# Patient Record
Sex: MALE | Race: WHITE | NOT HISPANIC OR LATINO | Employment: FULL TIME | URBAN - METROPOLITAN AREA
[De-identification: names, ages, dates, MRNs, and addresses within clinical notes are randomized per-mention and may not be internally consistent; named-entity substitution may affect disease eponyms.]

---

## 2023-11-09 PROBLEM — R06.09 DYSPNEA ON EXERTION: Status: ACTIVE | Noted: 2023-11-09

## 2023-11-09 PROBLEM — R73.09 ABNORMAL BLOOD SUGAR: Status: ACTIVE | Noted: 2023-11-09

## 2023-11-09 PROBLEM — R79.89 ABNORMAL LFTS (LIVER FUNCTION TESTS): Status: ACTIVE | Noted: 2023-11-09

## 2023-11-09 PROBLEM — E88.819 INSULIN RESISTANCE: Status: ACTIVE | Noted: 2023-11-09

## 2023-11-09 PROBLEM — E66.01 MORBID OBESITY (MULTI): Status: ACTIVE | Noted: 2023-11-09

## 2023-11-09 PROBLEM — N39.43 URINARY INCONTINENCE, POST-VOID DRIBBLING: Status: ACTIVE | Noted: 2023-11-09

## 2023-11-09 PROBLEM — N13.8 BPH WITH URINARY OBSTRUCTION: Status: ACTIVE | Noted: 2023-11-09

## 2023-11-09 PROBLEM — N40.1 BPH WITH URINARY OBSTRUCTION: Status: ACTIVE | Noted: 2023-11-09

## 2023-11-09 PROBLEM — R13.10 DYSPHAGIA: Status: ACTIVE | Noted: 2023-11-09

## 2023-11-09 PROBLEM — E78.5 HYPERLIPIDEMIA: Status: ACTIVE | Noted: 2023-11-09

## 2023-11-09 PROBLEM — I10 HYPERTENSION: Status: ACTIVE | Noted: 2023-11-09

## 2023-11-09 PROBLEM — R63.5 WEIGHT GAIN, ABNORMAL: Status: ACTIVE | Noted: 2023-11-09

## 2023-11-09 PROBLEM — M72.2 PLANTAR FASCIITIS, LEFT: Status: ACTIVE | Noted: 2023-11-09

## 2023-11-09 PROBLEM — N52.9 ERECTILE DYSFUNCTION: Status: ACTIVE | Noted: 2023-11-09

## 2023-11-09 PROBLEM — R35.0 FREQUENCY OF URINATION: Status: ACTIVE | Noted: 2023-11-09

## 2023-11-09 PROBLEM — E34.9 TESTOSTERONE DEFICIENCY: Status: ACTIVE | Noted: 2023-11-09

## 2023-11-09 PROBLEM — E55.9 VITAMIN D DEFICIENCY: Status: ACTIVE | Noted: 2023-11-09

## 2023-11-09 PROBLEM — D22.9 NEVUS, ATYPICAL: Status: ACTIVE | Noted: 2023-11-09

## 2023-11-09 RX ORDER — TESTOSTERONE ENANTHATE 200 MG/ML
VIAL (ML) INTRAMUSCULAR
COMMUNITY
Start: 2016-08-26

## 2023-11-09 RX ORDER — LISINOPRIL 20 MG/1
1 TABLET ORAL DAILY
COMMUNITY
Start: 2014-09-12

## 2023-11-11 ENCOUNTER — ANCILLARY PROCEDURE (OUTPATIENT)
Dept: RADIOLOGY | Facility: CLINIC | Age: 53
End: 2023-11-11
Payer: COMMERCIAL

## 2023-11-11 DIAGNOSIS — Z13.6 ENCOUNTER FOR SCREENING FOR CARDIOVASCULAR DISORDERS: ICD-10-CM

## 2023-11-11 PROCEDURE — 75571 CT HRT W/O DYE W/CA TEST: CPT

## 2023-11-13 ENCOUNTER — ANCILLARY PROCEDURE (OUTPATIENT)
Dept: RADIOLOGY | Facility: CLINIC | Age: 53
End: 2023-11-13
Payer: COMMERCIAL

## 2023-11-13 ENCOUNTER — OFFICE VISIT (OUTPATIENT)
Dept: ORTHOPEDIC SURGERY | Facility: CLINIC | Age: 53
End: 2023-11-13
Payer: COMMERCIAL

## 2023-11-13 DIAGNOSIS — S82.839A AVULSION FRACTURE OF DISTAL END OF FIBULA: Primary | ICD-10-CM

## 2023-11-13 DIAGNOSIS — M25.572 ACUTE LEFT ANKLE PAIN: ICD-10-CM

## 2023-11-13 PROCEDURE — 27786 TREATMENT OF ANKLE FRACTURE: CPT | Performed by: ORTHOPAEDIC SURGERY

## 2023-11-13 PROCEDURE — 99203 OFFICE O/P NEW LOW 30 MIN: CPT | Performed by: ORTHOPAEDIC SURGERY

## 2023-11-13 PROCEDURE — 73610 X-RAY EXAM OF ANKLE: CPT | Mod: LEFT SIDE | Performed by: ORTHOPAEDIC SURGERY

## 2023-11-13 PROCEDURE — 73610 X-RAY EXAM OF ANKLE: CPT | Mod: LT

## 2023-11-13 PROCEDURE — 99213 OFFICE O/P EST LOW 20 MIN: CPT | Mod: 25 | Performed by: ORTHOPAEDIC SURGERY

## 2023-11-13 NOTE — LETTER
November 13, 2023     Patient: Usama Alves   YOB: 1970   Date of Visit: 11/13/2023       To Whom It May Concern:    It is my medical opinion that Usama Alves may return to work on 11/13/23 with the following restrictions: must wear boot, mostly sit down type work, ok to drive tow motor, use knee walker/crutches as needed for ambulation .    If you have any questions or concerns, please don't hesitate to call.         Sincerely,        Mart Arora MD    CC: No Recipients

## 2023-11-13 NOTE — PROGRESS NOTES
History: Usama is here today for his left ankle.  He stepped on a large rock while at work 10 days ago and rolled his ankle.  He noted acute onset of pain and went to an urgent care.  They gave him a boot and crutches and have him follow-up with orthopedics.  He is here today as a new patient.    Past medical history: Multiple  Medications: Multiple  Allergies: No known drug allergies    Please refer to the intake H&P regarding the patient's review of systems, family history and social history as was done today    HEENT: Normal  Lungs: Clear to auscultation  Heart: RRR  Abdomen: Soft, nontender  Skin: clear  Extremity: This is a pleasant 53-year-old male in no apparent distress.  He has decent ankle range of motion.  He has no pain medially.  There is no pain proximally around the knee.  He has tenderness to palpation laterally around the fibula.  There is moderate swelling and ecchymosis.  Skin is intact throughout.  He denies any numbness or tingling.  He is neurovascularly intact.  Contralateral exam is normal for strength, motion, stability and neurovascular assessment.    Radiographs: 3 view left ankle x-rays show a small avulsion around the tip of the distal fibula.  Mortise is intact.  There are some mild degenerative change throughout the ankle as well.    Assessment: Left ankle work-related distal fibular avulsion fracture    Plan: He does have a boot from the urgent care and he can continue to use this for any ambulation.  He is going to continue to ice and elevate the ankle.  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  He would like to continue to work with restrictions.  He will do mostly sitdown work.  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  He would like to get a knee walker as well to help him at work.  We will see him back in another 3 weeks to assess progress with three-view ankle x-rays.  If doing well at that point we can  begin to wean from the boot and transition to a lace up brace.   All questions were answered today with the patient.    This note was generated with voice recognition software and may contain grammatical errors.

## 2023-11-21 ENCOUNTER — TELEPHONE (OUTPATIENT)
Dept: ORTHOPEDIC SURGERY | Facility: CLINIC | Age: 53
End: 2023-11-21
Payer: COMMERCIAL

## 2023-11-21 NOTE — TELEPHONE ENCOUNTER
11/14/23 - DME  supervisor called and LVM with patients employer (HR department) @ [employer] to get [employer] information for durable medical equipment C-9 approval.   11/20/23 - DME supervisor called and LVM again with patients employer (HR department) @ [employer] asking them to provide their [employer] information and or fax number so that we can submit a C-9 for approval on durable medical equipment

## 2023-12-01 ENCOUNTER — TELEPHONE (OUTPATIENT)
Dept: ORTHOPEDIC SURGERY | Facility: CLINIC | Age: 53
End: 2023-12-01
Payer: COMMERCIAL

## 2023-12-01 NOTE — TELEPHONE ENCOUNTER
12/1/23 - ankle lace up - faxed all paperwork to [employer] ([phone]) for approval on brace  Knee walker - per pt request - all paperwork faxed for approval on this item same day    Note:   The delay in sending for auth from [facility] -  DME made multiple calls leaving voice messages each time for patients employer ([employer]) HR department asking for their [facility] M[employer] information.   We received no call back from the employer.

## 2023-12-01 NOTE — TELEPHONE ENCOUNTER
12/1/23 - ankle lace up approved by [facility] $0  Knee walker - also approved for rental only, but patient follow up is 12/4/23 and depending on how he is healing or doing, will go into the ankle lace up.   I will discuss this with the patient at his follow up

## 2023-12-04 ENCOUNTER — ANCILLARY PROCEDURE (OUTPATIENT)
Dept: RADIOLOGY | Facility: CLINIC | Age: 53
End: 2023-12-04
Payer: COMMERCIAL

## 2023-12-04 ENCOUNTER — OFFICE VISIT (OUTPATIENT)
Dept: ORTHOPEDIC SURGERY | Facility: CLINIC | Age: 53
End: 2023-12-04
Payer: COMMERCIAL

## 2023-12-04 DIAGNOSIS — S82.839A AVULSION FRACTURE OF DISTAL END OF FIBULA: ICD-10-CM

## 2023-12-04 PROCEDURE — 73610 X-RAY EXAM OF ANKLE: CPT | Mod: LEFT SIDE | Performed by: ORTHOPAEDIC SURGERY

## 2023-12-04 PROCEDURE — 73610 X-RAY EXAM OF ANKLE: CPT | Mod: LT

## 2023-12-04 PROCEDURE — 99024 POSTOP FOLLOW-UP VISIT: CPT | Performed by: ORTHOPAEDIC SURGERY

## 2023-12-04 NOTE — LETTER
December 4, 2023     Patient: Usama Alves   YOB: 1970   Date of Visit: 12/4/2023       To Whom It May Concern:    It is my medical opinion that Usama Alves  continue to work with the restriction of sitdown as needed for the next 2 weeks.  He can be released from the walking boot.  In 2 weeks he can then be released to full activity with no restrictions. .    If you have any questions or concerns, please don't hesitate to call.         Sincerely,          12/04/23 at 8:21 AM - Duyen Bernabe PA-C

## 2023-12-04 NOTE — PROGRESS NOTES
History: Usama is here today for his left ankle.  He is just over 4 weeks from a distal fibula fracture.  He has been working with some restrictions.  He has been wearing the boot when at work but not around the house.  He had a few days off around Thanksgiving and had the boot off without any pain.  He did slip in the boot while at work last week.  He denies any issues today from that slip.    Physical Exam: The skin is clear throughout.  He has full ankle range of motion.  He is able to wiggle the toes.  He has mild tenderness around the distal fibula.  No pain medially.  No pain proximally in the leg.  He is neurovascular intact distally.      Radiographs: 3 view ankle x-rays show good alignment of his ankle mortise with interval healing noted.     Assessment: Healing left distal fibular fracture, 4 weeks out    Plan: He has already started weaning from the boot without any pain.  We did discuss an ankle lace up brace to transition out of the boot but he does not feel he needs it.  He was also approved for therapy but given his motion he wants to hold off for the time being.  He would like to keep the restriction of sit down as needed for the next 2 weeks and if doing well at that point he can released to full activity with no restrictions.  If continuing to do well can follow-up on an as-needed basis.  All questions were answered the patient.